# Patient Record
Sex: FEMALE | Race: BLACK OR AFRICAN AMERICAN | NOT HISPANIC OR LATINO | Employment: STUDENT | URBAN - METROPOLITAN AREA
[De-identification: names, ages, dates, MRNs, and addresses within clinical notes are randomized per-mention and may not be internally consistent; named-entity substitution may affect disease eponyms.]

---
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
## 2024-12-10 ENCOUNTER — HOSPITAL ENCOUNTER (EMERGENCY)
Facility: HOSPITAL | Age: 19
Discharge: HOME | End: 2024-12-10
Attending: EMERGENCY MEDICINE
Payer: COMMERCIAL

## 2024-12-10 VITALS
OXYGEN SATURATION: 100 % | SYSTOLIC BLOOD PRESSURE: 114 MMHG | HEIGHT: 67 IN | HEART RATE: 99 BPM | RESPIRATION RATE: 18 BRPM | DIASTOLIC BLOOD PRESSURE: 84 MMHG | TEMPERATURE: 99 F | WEIGHT: 150 LBS | BODY MASS INDEX: 23.54 KG/M2

## 2024-12-10 DIAGNOSIS — R21 RASH: ICD-10-CM

## 2024-12-10 DIAGNOSIS — L50.9 URTICARIA: Primary | ICD-10-CM

## 2024-12-10 LAB
B-HCG UR QL: NEGATIVE
CTP QC/QA: YES

## 2024-12-10 PROCEDURE — 99283 EMERGENCY DEPT VISIT LOW MDM: CPT

## 2024-12-10 PROCEDURE — 25000003 PHARM REV CODE 250: Performed by: PHYSICIAN ASSISTANT

## 2024-12-10 PROCEDURE — 81025 URINE PREGNANCY TEST: CPT | Performed by: PHYSICIAN ASSISTANT

## 2024-12-10 PROCEDURE — 63600175 PHARM REV CODE 636 W HCPCS: Performed by: PHYSICIAN ASSISTANT

## 2024-12-10 RX ORDER — CETIRIZINE HYDROCHLORIDE 10 MG/1
10 TABLET ORAL DAILY
Qty: 5 TABLET | Refills: 0 | Status: SHIPPED | OUTPATIENT
Start: 2024-12-10 | End: 2024-12-15

## 2024-12-10 RX ORDER — PREDNISONE 20 MG/1
20 TABLET ORAL DAILY
Qty: 2 TABLET | Refills: 0 | Status: SHIPPED | OUTPATIENT
Start: 2024-12-11 | End: 2024-12-13

## 2024-12-10 RX ORDER — PREDNISONE 20 MG/1
40 TABLET ORAL
Status: COMPLETED | OUTPATIENT
Start: 2024-12-10 | End: 2024-12-10

## 2024-12-10 RX ORDER — FAMOTIDINE 20 MG/1
20 TABLET, FILM COATED ORAL 2 TIMES DAILY
Qty: 6 TABLET | Refills: 0 | Status: SHIPPED | OUTPATIENT
Start: 2024-12-10 | End: 2024-12-13

## 2024-12-10 RX ORDER — CETIRIZINE HYDROCHLORIDE 10 MG/1
10 TABLET ORAL
Status: COMPLETED | OUTPATIENT
Start: 2024-12-10 | End: 2024-12-10

## 2024-12-10 RX ORDER — FAMOTIDINE 20 MG/1
20 TABLET, FILM COATED ORAL
Status: COMPLETED | OUTPATIENT
Start: 2024-12-10 | End: 2024-12-10

## 2024-12-10 RX ORDER — DIPHENHYDRAMINE HCL 25 MG
25 CAPSULE ORAL
Status: COMPLETED | OUTPATIENT
Start: 2024-12-10 | End: 2024-12-10

## 2024-12-10 RX ORDER — DIPHENHYDRAMINE HCL 25 MG
25 CAPSULE ORAL EVERY 6 HOURS PRN
Qty: 12 CAPSULE | Refills: 0 | Status: SHIPPED | OUTPATIENT
Start: 2024-12-10 | End: 2024-12-13

## 2024-12-10 RX ADMIN — DIPHENHYDRAMINE HYDROCHLORIDE 25 MG: 25 CAPSULE ORAL at 03:12

## 2024-12-10 RX ADMIN — PREDNISONE 40 MG: 20 TABLET ORAL at 03:12

## 2024-12-10 RX ADMIN — CETIRIZINE HYDROCHLORIDE 10 MG: 10 TABLET, FILM COATED ORAL at 03:12

## 2024-12-10 RX ADMIN — FAMOTIDINE 20 MG: 20 TABLET ORAL at 03:12

## 2024-12-10 NOTE — FIRST PROVIDER EVALUATION
Medical screening examination initiated.  I have conducted a focused provider triage encounter, findings are as follows:    Brief history of present illness:  20 y/o F presenting for itching  Recent diagnosis with DVT. Seen at [facility] and diagnosed with DVT, started on eliquis  No new soaps lotions foods meds or deteregents.   Denies CP SOB facial swelling nvd or other symptoms      There were no vitals filed for this visit.    Pertinent physical exam:  anxious, scratching in triage. No visible rash     Brief workup plan:  further evaluation    Preliminary workup initiated; this workup will be continued and followed by the physician or advanced practice provider that is assigned to the patient when roomed.  
1801

## 2024-12-10 NOTE — ED PROVIDER NOTES
Encounter Date: 12/10/2024       History     Chief Complaint   Patient presents with    Allergic Reaction     Pt sts she was recently dxed w/ a blood clot in her leg and rxed Eliquis and Diflunisal. Pt sts she started breaking out in a rash that is itching and burning throughout her body (pt sts its on her arms. Legs and abdomen traveling to her back). Pt denies SOB, CP and does not have hives or edema. Pt reports being seen at [place] [id number] for the same complaint w/ no relief of s/s.      19-year-old female with recent diagnosis of DVT in the left leg presents to the emergency department today for evaluation of an itchy rash.  Patient reports she was recently diagnosed with a DVT.  She was started on Eliquis last week, which she has been taking as prescribed.  Patient reports shortly after starting Eliquis she began to break out in an itchy rash on her legs.  The rash then spread to her back and her abdomen.  Patient reports there is a slight burning sensation noted to the rash as well.  She has not attempted any medication for treatment of the rash so far.  Other than starting Eliquis, she denies any new medications, new food, or new topical products prior to onset of symptoms.  She has an appointment with a cardiologist in 2 days from today.  She denies any chest tightness, feeling of her throat swelling, diarrhea or vomiting.    The history is provided by the patient. No  was used.     Review of patient's allergies indicates:  No Known Allergies  No past medical history on file.  No past surgical history on file.  No family history on file.  Social History     Tobacco Use    Smoking status: Some Days     Types: Vaping with nicotine    Smokeless tobacco: Never   Substance Use Topics    Alcohol use: Never    Drug use: Never     Review of Systems   Constitutional:  Negative for chills, fatigue and fever.   HENT:  Negative for congestion, sore throat and trouble swallowing.    Respiratory:   Negative for chest tightness, shortness of breath and wheezing.    Cardiovascular:  Negative for chest pain.   Gastrointestinal:  Negative for abdominal pain, diarrhea, nausea and vomiting.   Genitourinary:  Negative for dysuria.   Musculoskeletal:  Negative for back pain.   Skin:  Positive for rash.   Neurological:  Negative for weakness.   Hematological:  Does not bruise/bleed easily.       Physical Exam     Initial Vitals [12/10/24 1400]   BP Pulse Resp Temp SpO2   114/84 99 18 98.5 °F (36.9 °C) 100 %      MAP       --         Physical Exam    Nursing note and vitals reviewed.  Constitutional: She appears well-developed and well-nourished. She is not diaphoretic. No distress.   HENT:   Head: Normocephalic and atraumatic.   Right Ear: External ear normal.   Left Ear: External ear normal. Mouth/Throat: Uvula is midline, oropharynx is clear and moist and mucous membranes are normal.   Eyes: Conjunctivae are normal.   Cardiovascular:  Normal rate, regular rhythm and intact distal pulses.           Pulmonary/Chest: Breath sounds normal. No respiratory distress.   Abdominal: Abdomen is soft. She exhibits no distension. There is no abdominal tenderness.     Neurological: She is alert and oriented to person, place, and time. GCS score is 15. GCS eye subscore is 4. GCS verbal subscore is 5. GCS motor subscore is 6.   Skin: Skin is warm and dry. Capillary refill takes less than 2 seconds. Petechiae and rash (generalized, see photos for details) noted. Rash is urticarial.   Psychiatric: She has a normal mood and affect. Her behavior is normal.                   ED Course   Procedures  Labs Reviewed   POCT URINE PREGNANCY       Result Value    POC Preg Test, Ur Negative       Acceptable Yes            Imaging Results    None          Medications   cetirizine tablet 10 mg (10 mg Oral Given 12/10/24 1555)   diphenhydrAMINE capsule 25 mg (25 mg Oral Given 12/10/24 1555)   predniSONE tablet 40 mg (40 mg Oral Given  12/10/24 1545)   famotidine tablet 20 mg (20 mg Oral Given 12/10/24 3114)     Medical Decision Making  Encounter Date: 12/10/2024  -------------------------------------------------------------------------------  19-year-old female with recent diagnosis of DVT in the left leg presents to the emergency department today for evaluation of an itchy rash.  Patient reports she was recently diagnosed with a DVT.  She was started on Eliquis last week, which she has been taking as prescribed.  Patient reports shortly after starting Eliquis she began to break out in an itchy rash on her legs.  The rash then spread to her back and her abdomen.  Patient reports there is a slight burning sensation noted to the rash as well.  She has not attempted any medication for treatment of the rash so far.  Other than starting Eliquis, she denies any new medications, new food, or new topical products prior to onset of symptoms.  She has an appointment with a cardiologist in 2 days from today.  She denies any chest tightness, feeling of her throat swelling, diarrhea or vomiting..  Hemodynamically stable. Afebrile. Phonating and protecting the airway spontaneously. No clinical evidence for cardiovascular instability or impending airway compromise.  Remainder of physical exam as above.    Additional or Independent Historians available and contributing to the history as above: NONE  Prior medical records, when available, were reviewed. This includes a review of the patients comorbidities, medications, and recent hospital or outpatient notes.  Comorbid Conditions affecting care: NONE IDENTIFIED    Vitals range:   Temp:  [98.5 °F (36.9 °C)] 98.5 °F (36.9 °C)  Pulse:  [99] 99  Resp:  [18] 18  SpO2:  [100 %] 100 %  BP: (114)/(84) 114/84    Differential diagnoses includes but is not limited to:   Allergic reaction, urticaria, eczema  -------------------------------------------------------------------------------  All available clinical tests were  reviewed by me and documented in the ED course.    ED MEDS GIVEN:  Medications  cetirizine tablet 10 mg (10 mg Oral Given 12/10/24 1555)  diphenhydrAMINE capsule 25 mg (25 mg Oral Given 12/10/24 1555)  predniSONE tablet 40 mg (40 mg Oral Given 12/10/24 1545)  famotidine tablet 20 mg (20 mg Oral Given 12/10/24 1554)    PROCEDURES PERFORMED: None  -------------------------------------------------------------------------------  Clinical picture today most consistent with urticaria.   Doubt alternate pathology as listed in the differential above.  Patient with symptomatic improvement after receiving about medications in the ED. we discussed a plan to discharge the patient with prescriptions for these same medications to take over the next few days until she meets with the cardiologist.  When she meets with the cardiologist in 2 days, if they want to switch her from Eliquis to a different medication they may.  I advised patient to return to the ED sooner if any symptoms worsen before her appointment with the cardiologist.  Patient voiced understanding and is agreeable with this plan.  Final diagnoses:  [L50.9] Urticaria (Primary)  [R21] Rash         Social Determinates of Health identified and considered in the treatment plan of this patient: None Identified or significantly impacting evaluation and treatment    I see no indication of an emergent process beyond that addressed during our encounter but have duly counseled the patient/family regarding the need for prompt follow-up as well as the indications that should prompt immediate return to the emergency room should new or worrisome developments occur.  The patient/family has been provided with verbal and printed direction regarding our final diagnosis(es) as well as instructions regarding use of OTC and/or Rx medications intended to manage the patient's conditions.   The patient/family communicates understanding of all this information and all remaining questions and  concerns were addressed at this time.  DISCLAIMER: This note was prepared with Suite[employer] voice recognition transcription software. Garbled syntax, mangled pronouns, and other bizarre constructions may be attributed to that software system.      Amount and/or Complexity of Data Reviewed  Labs:  Decision-making details documented in ED Course.    Risk  OTC drugs.  Prescription drug management.               ED Course as of 12/10/24 1658   Tue Dec 10, 2024   1536 POCT urine pregnancy  neg [MR]      ED Course User Index  [MR] Mya Daniel PA-C                           Clinical Impression:  Final diagnoses:  [L50.9] Urticaria (Primary)  [R21] Rash          ED Disposition Condition    Discharge Stable          ED Prescriptions       Medication Sig Dispense Start Date End Date Auth. Provider    cetirizine (ZYRTEC) 10 MG tablet Take 1 tablet (10 mg total) by mouth once daily. for 5 days 5 tablet 12/10/2024 12/15/2024 Mya Daniel PA-C    diphenhydrAMINE (BENADRYL) 25 mg capsule Take 1 capsule (25 mg total) by mouth every 6 (six) hours as needed for Itching or Allergies. 12 capsule 12/10/2024 12/13/2024 Mya Daniel PA-C    famotidine (PEPCID) 20 MG tablet Take 1 tablet (20 mg total) by mouth 2 (two) times daily. for 3 days 6 tablet 12/10/2024 12/13/2024 Mya Daniel PA-C    predniSONE (DELTASONE) 20 MG tablet Take 1 tablet (20 mg total) by mouth once daily. for 2 days 2 tablet 12/11/2024 12/13/2024 Mya Daniel PA-C          Follow-up Information       Follow up With Specialties Details Why Contact Info    [facility] [place] - Emergency Dept Emergency Medicine Go to  As needed, If symptoms worsen [address] [facility] [place] [address] [phone]             Mya Daniel PA-C  12/10/24 3746

## 2024-12-10 NOTE — DISCHARGE INSTRUCTIONS
Please take the prescribed medications according to the directions on the bottle. If your symptoms worsen you may return to the ED for reevaluation. Otherwise, please follow up with the cardiologist in 2 days as scheduled.